# Patient Record
(demographics unavailable — no encounter records)

---

## 2024-10-29 NOTE — ASSESSMENT
[FreeTextEntry1] : Patient has osteoarthritis right thumb.  No further treatments are necessary at this time.  She does not want to consider surgical intervention and I agree with her.  Anti-inflammatories.  We discussed bracing the natural history of the condition as well.  She will see me back as needed

## 2024-10-29 NOTE — PHYSICAL EXAM
[de-identified] : Patient is pain along the basal joint right thumb.  Mildly positive actual grind is present.  Normal sensation normal capillary refill.  No erythema ecchymoses or abrasions

## 2024-10-29 NOTE — HISTORY OF PRESENT ILLNESS
[de-identified] : 74-year-old female pain discomfort in her right wrist area.  She has had known arthritis at the base of the thumb.  She comes in today for evaluation.  Has been using some topical ointments.  She is using a brace this keeps it under control.  And she comes in for the evaluation today.

## 2025-01-03 NOTE — HISTORY OF PRESENT ILLNESS
[de-identified] : Ms. Aldridge is a 74 year old female who presents to the walk in for evaluation of L knee and R foot pain s/p fall that occurred a month ago.  She states she fell and landed on the L knee and twisted her R foot.  She states her left knee pain has improved however has noticed that she developed a rash on the skin.  She has been applying creams as well as wearing compression stockings and I advised that it could be as a result of that and she should stop using it.  She states her range of motion is better and she does sometimes however experience some buckling in the knee.  She has been using Tylenol arthritis with improvement.  In regards to the right foot she states she noticed a swelling as well as some skin breakdown over the lateral aspect of the foot.  She is a diabetic and believes that it is related to that.  She states her range of motion and the pain she is experiencing from the fall is getting better.  She is afraid to wear a boot or any type of protective shoe due to the issues she is currently having with her foot.

## 2025-01-03 NOTE — IMAGING
[de-identified] : L knee: Negative anterior drawer sign, negative Lachman's test, mild tenderness diffusely, full range of motion flexion extension, no edema noted, neurovascular intact, no rash noted.  Right foot: Positive erythema the lateral aspect of the foot with mild swelling noted, mild warmth to the touch, ulcerated lesion noted at the lateral aspect with no active drainage, tenderness over the dorsum of the foot.  X-ray left knee 3 views: Degenerative changes noted.  X-ray right foot 3 views: Degenerative changes noted throughout, no fractures noted.

## 2025-01-03 NOTE — ASSESSMENT
[FreeTextEntry1] : 74-year-old female with left knee sprain as well as right foot sprain, right foot ulcer.  With respect to the knee I have provided with a prescription to attend physical therapy and she will continue to take the Tylenol arthritis.  She will follow-up in 6 weeks for reassessment.  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  I advised her that if her pain, redness, or ulcer worsens she is to go to the emergency room over the weekend and she verbalized understanding and agreement.  She is aware if she has any issues she will contact the office and she verbalized understanding and agreement.

## 2025-01-03 NOTE — HISTORY OF PRESENT ILLNESS
[de-identified] : Ms. Aldridge is a 74 year old female who presents to the walk in for evaluation of L knee and R foot pain s/p fall that occurred a month ago.  She states she fell and landed on the L knee and twisted her R foot.  She states her left knee pain has improved however has noticed that she developed a rash on the skin.  She has been applying creams as well as wearing compression stockings and I advised that it could be as a result of that and she should stop using it.  She states her range of motion is better and she does sometimes however experience some buckling in the knee.  She has been using Tylenol arthritis with improvement.  In regards to the right foot she states she noticed a swelling as well as some skin breakdown over the lateral aspect of the foot.  She is a diabetic and believes that it is related to that.  She states her range of motion and the pain she is experiencing from the fall is getting better.  She is afraid to wear a boot or any type of protective shoe due to the issues she is currently having with her foot.

## 2025-01-03 NOTE — IMAGING
[de-identified] : L knee: Negative anterior drawer sign, negative Lachman's test, mild tenderness diffusely, full range of motion flexion extension, no edema noted, neurovascular intact, no rash noted.  Right foot: Positive erythema the lateral aspect of the foot with mild swelling noted, mild warmth to the touch, ulcerated lesion noted at the lateral aspect with no active drainage, tenderness over the dorsum of the foot.  X-ray left knee 3 views: Degenerative changes noted.  X-ray right foot 3 views: Degenerative changes noted throughout, no fractures noted. Retention Suture Bite Size: 3 mm

## 2025-01-10 NOTE — DATA REVIEWED
[Left] : left [Hip] : hip [FreeTextEntry1] : 2 views of the left hip were obtained here in the office today and show: No obvious fracture, mild arthritic changes.
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
## 2025-01-10 NOTE — DISCUSSION/SUMMARY
[de-identified] : I reviewed the x-ray findings with the patient.  For now she can weight-bear as tolerated.  She will obtain an MRI of the left hip to evaluate the source of her pain.  She will call me 3 to 4 days after the MRI is performed so we can discuss the results.  She cannot take oral anti-inflammatory medications I recommended Tylenol arthritis.  She will follow-up in 1 month for further evaluation with Dr. Carney.

## 2025-01-10 NOTE — IMAGING
[de-identified] : Physical exam of the left hip: No edema, no erythema or ecchymosis.  She can straight leg raise.  Good range of motion with flexion extension, external and internal rotation as well as abduction and adduction of the left hip.  Mild tenderness to palpation over the groin.  No tenderness to palpation over the anterior superior iliac crest.  She has tenderness to palpation over the greater trochanter.  No tenderness to palpation over the left anterior mid thigh or left anterior distal thigh.  Intact to light touch, nonantalgic gait.

## 2025-01-13 NOTE — DATA REVIEWED
[FreeTextEntry1] : I reviewed the patient's prior AP lateral flexion-extension cervical spine x-rays.  The patient has a cervical SVA of 10 cm in neutral position.  With extension this reduces to about 5 cm.

## 2025-01-13 NOTE — HISTORY OF PRESENT ILLNESS
[de-identified] : 74-year-old female returns for follow-up.  She continues to complain to me of drop head symptoms she has difficulty ambulating.  She is always looking at the ground she feels that she is extending her back to look upwards.  It is interfering with her daily life.  We discussed surgery in the past and that it was a very large undertaking and very risky.  She is still considering her options.  She had a DEXA scan done recently and was diagnosed with osteopenia with no osteoporosis.  She would like to try some more physical therapy on her neck and learn more about the surgery.

## 2025-01-13 NOTE — DISCUSSION/SUMMARY
[de-identified] : 74-year-old female with a dropped head.  I reviewed her images with her her MRI and her cervical spine her cervical spine x-rays show a cervical SVA of 10 cm this reduces to 5 cm on extension.  I discussed with the patient the surgical treatment option which would be a C3 7 ACDF C2-T2 posterior cervical instrumentation and fusion.  I discussed with her that this will be a very large undertaking with a 25% major complication rate.  I also explained to her that she should consider not the possibility of a complication happening but what will be the complication that happens.  I told her not to go through surgery unless she is willing to risks paralysis and death.  I explained to her to consider her quality of life right now and if she cannot live in her current state then if surgery is successful and the complications are resolved she could potentially be satisfied with the results.  I would like her to see a second opinion.  She will call if she would like to proceed with surgery or has any other questions.

## 2025-02-18 NOTE — HISTORY OF PRESENT ILLNESS
[de-identified] : left  hip pain pain getting into and out of a car  NAD left hip:  no skin breakdown FF 0-110 ER 40 IR 20 ttp SI joint NVI comp soft and nt  Xray left hip: mild hip oa  Plan: went over findings explained that her pain is more related to her SI joint, not to her hip rec PT and possibly PM for injections wants to do PT for now
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
## 2025-04-11 NOTE — ASSESSMENT
[FreeTextEntry1] : Recommended a cane, sleeve, heat can hold PT therapy deferred on a knee cortisone injection today I will see her in a couple of months f/u with PM  dr villanueva for SI joint
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
## 2025-04-11 NOTE — REASON FOR VISIT
[FreeTextEntry2] : bilat knee pain L knee especiallly PT x8 no help AT suggested  L SI joint injection  HgbA1c 6.9  still using knee sleeves  still right foot ulcers

## 2025-04-15 NOTE — ASSESSMENT
[FreeTextEntry1] : This is a 74-year-old female with complaints of localized neck pain and headaches in the back of the neck. Patient denies any radicular features into the upper extremities. She returns after a delay with new complaints of lower back pain. The pain is mainly left sided over the left SI joint and is not associated with radicular features into the lower extremities. Pain is not associated with numbness or tingling. I will obtain an MRI of the lumbar spine for further evaluation. She will follow up in 3 weeks to review imaging. All this patients questions were answered and the conversation was understood well.  Lumbar MRI without contrast was ordered to delineate spine pathology such as disc displacement and stenosis.  Entered by Kristen Lucio, acting as scribe for Dr. Rivera.  Documentation recorded by the scribe, in my presence, accurately reflects the service I personally performed, and the decisions made by me with my edits as appropriate.     Thank you for allowing me to assist in the management of this patient.     Best Regards,     Kaya Rivera M.D., FAAPMR     Diplomate, American Board of Physical Medicine and Rehabilitation Diplomate, American Board of Pain Medicine
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
## 2025-04-15 NOTE — PHYSICAL EXAM
[Normal Coordination] : normal coordination [Normal DTR UE/LE] : normal DTR UE/LE  [Normal Sensation] : normal sensation [Normal Mood and Affect] : normal mood and affect [Oriented] : oriented [Able to Communicate] : able to communicate [Well Developed] : well developed [Well Nourished] : well nourished [FreeTextEntry3] : Exaggerated kyphosis. [FreeTextEntry8] : Palpable trigger points noted in the trapezius muscles bilaterally. [FreeTextEntry9] : Extension>flexion [de-identified] : extension 5 degrees [TWNoteComboBox7] : forward flexion 30 degrees [Flexion] : flexion [Extension] : extension [] : patient ambulates without assistive device [de-identified] : Brien testing is positive for reproduction of pain at the PSIS, and there is a positive Yuriy Finger test, thigh thrust and a positive Gaenslen's test on the left

## 2025-04-15 NOTE — DATA REVIEWED
[FreeTextEntry1] : MRI of the cervical spine dated 7/7/2023 finds C2-3, C3-4, C4-5, C5-6 and C6-7 disc herniation deforming the thecal sac abutting the spinal cord contributing to central spinal stenosis, moderate at C2-3 C3-4 C4-5 and C6-7, mild at C5-6 in conjunction with posterior ligamentous hypertrophy.  C3-4 left neural foraminal narrowing and C4-C5-C6-7 bilateral neural foraminal narrowing in conjunction with facet and uncinate hypertrophic changes C4-5 grade 1 spondylolisthesis, C6-7 grade 1 retrolisthesis, C5-6 and C6-7 disc degeneration paracentral osseous hypertrophic changes.  Cervical spine straightening with superimposed kyphosis.  No significant change in above findings compared to previous MRI study dated 2/18/2022.  SOAPP: Low Risk  UDS: No data obtained today.   [place] REGISTRY: Checked.

## 2025-05-07 NOTE — PHYSICAL EXAM
[Normal Coordination] : normal coordination [Normal DTR UE/LE] : normal DTR UE/LE  [Normal Sensation] : normal sensation [Normal Mood and Affect] : normal mood and affect [Oriented] : oriented [Able to Communicate] : able to communicate [Well Developed] : well developed [Well Nourished] : well nourished [Flexion] : flexion [Extension] : extension [FreeTextEntry3] : Exaggerated kyphosis. [FreeTextEntry8] : Palpable trigger points noted in the trapezius muscles bilaterally. [FreeTextEntry9] : Extension>flexion [TWNoteComboBox7] : forward flexion 30 degrees [de-identified] : extension 5 degrees [] : no swelling [de-identified] : Brien testing is positive for reproduction of pain at the PSIS, and there is a positive Yuriy Finger test, thigh thrust and a positive Gaenslen's test on the left
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
## 2025-05-07 NOTE — DATA REVIEWED
[FreeTextEntry1] : MRI of the lumbar spine dated 5/1/2025 demonstrates thin spondylosis with degenerative scoliosis and grade 1 anterior listhesis of L5 on S1.  L1-2 mild to moderate central and left foraminal stenosis with mild right foraminal stenosis.  L2-3 moderate central stenosis with moderate to severe by foraminal stenosis.  L3-4 mild central stenosis with severe right moderate severe left foraminal stenosis.  L5-S1 mild-moderate biforaminal stenosis.  MRI of the cervical spine dated 7/7/2023 finds C2-3, C3-4, C4-5, C5-6 and C6-7 disc herniation deforming the thecal sac abutting the spinal cord contributing to central spinal stenosis, moderate at C2-3 C3-4 C4-5 and C6-7, mild at C5-6 in conjunction with posterior ligamentous hypertrophy.  C3-4 left neural foraminal narrowing and C4-C5-C6-7 bilateral neural foraminal narrowing in conjunction with facet and uncinate hypertrophic changes C4-5 grade 1 spondylolisthesis, C6-7 grade 1 retrolisthesis, C5-6 and C6-7 disc degeneration paracentral osseous hypertrophic changes.  Cervical spine straightening with superimposed kyphosis.  No significant change in above findings compared to previous MRI study dated 2/18/2022.  SOAPP: Low Risk  UDS: No data obtained today.   [place] REGISTRY: Checked.

## 2025-05-07 NOTE — ASSESSMENT
[FreeTextEntry1] : This is a 74-year-old female with complaints of localized neck pain and headaches in the back of the neck. Patient denies any radicular features into the upper extremities. She returns after a delay with new complaints of lower back pain. The pain is mainly left sided over the left SI joint and is not associated with radicular features into the lower extremities. Imaging studies as well as physical exam findings corroborate the symptomatology. Her pain is intermittent at this time, and she wishes to hold off on proceeding with a diagnostic left SI joint injection. She will call the office if she decides to move forward. All this patients questions were answered and the conversation was understood well.   Entered by Kristen Lucio, acting as scribe for Dr. Rivera.  Documentation recorded by the scribe, in my presence, accurately reflects the service I personally performed, and the decisions made by me with my edits as appropriate.     Thank you for allowing me to assist in the management of this patient.     Best Regards,     Kaya Rivera M.D., FAAPMR     Diplomate, American Board of Physical Medicine and Rehabilitation Diplomate, American Board of Pain Medicine

## 2025-07-01 NOTE — PHYSICAL EXAM
[de-identified] : Patient pain along the basal joint axial grind of the right thumb.  Normal capillary refill.  No erythema ecchymosis or abrasions.  No masses no instabilities mild swelling

## 2025-07-01 NOTE — ASSESSMENT
[FreeTextEntry1] : Patient has right-sided basal joint arthritis.  Patient received cortisone injection into the basal joint today.  She tolerated it well.  Will see how the injection does.  She will try a brace that she already has.  She knows she can get another injection for at least 6 months
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
## 2025-07-01 NOTE — PROCEDURE
[FreeTextEntry3] : Thumb CMC injection was performed because of pain inflammation and stiffness Anesthesia: ethyl chloride sprayed topically Dexamethasone: An injection of Dexamethasone 1cc  4mg/ml Lidocaine: An injection of Lidocaine 1% 1cc  Patient has tried OTC's including aspirin, Ibuprofen, Aleve etc or prescription NSAIDS, and/or exercises at home and/ or physical therapy without satisfactory response. After verbal consent using sterile preparation and technique. The risks, benefits, and alternatives to cortisone injection were explained in full to the patient. Risks outlined include but are not limited to infection, sepsis, bleeding, scarring, skin discoloration, temporary increase in pain, syncopal episode, failure to resolve symptoms, allergic reaction, symptom recurrence, and elevation of blood sugar in diabetics. Patient understood the risks. All questions were answered. After discussion of options, patient requested an injection. Oral informed consent was obtained and sterile prep was done of the injection site. Sterile technique was utilized for the procedure including the preparation of the solutions used for the injection. Patient tolerated the procedure well. Advised to ice the injection site this evening. Prep with ETOH locally to site. Sterile technique used.  The basal joint of the thumb was injected  Right thumb basal joint